# Patient Record
Sex: MALE | Race: WHITE | NOT HISPANIC OR LATINO | Employment: FULL TIME | URBAN - NONMETROPOLITAN AREA
[De-identification: names, ages, dates, MRNs, and addresses within clinical notes are randomized per-mention and may not be internally consistent; named-entity substitution may affect disease eponyms.]

---

## 2017-02-28 ENCOUNTER — APPOINTMENT (OUTPATIENT)
Dept: GENERAL RADIOLOGY | Facility: HOSPITAL | Age: 42
End: 2017-02-28

## 2017-02-28 ENCOUNTER — HOSPITAL ENCOUNTER (EMERGENCY)
Facility: HOSPITAL | Age: 42
Discharge: HOME | End: 2017-02-28
Attending: EMERGENCY MEDICINE | Admitting: EMERGENCY MEDICINE

## 2017-02-28 VITALS
BODY MASS INDEX: 29.66 KG/M2 | RESPIRATION RATE: 20 BRPM | HEART RATE: 65 BPM | SYSTOLIC BLOOD PRESSURE: 128 MMHG | HEIGHT: 67 IN | DIASTOLIC BLOOD PRESSURE: 83 MMHG | TEMPERATURE: 98.2 F | WEIGHT: 189 LBS | OXYGEN SATURATION: 98 %

## 2017-02-28 DIAGNOSIS — R07.9 CHEST PAIN, UNSPECIFIED TYPE: Primary | ICD-10-CM

## 2017-02-28 LAB
ALBUMIN SERPL-MCNC: 4.4 G/DL (ref 3.5–5)
ALBUMIN/GLOB SERPL: 1.4 G/DL (ref 1.1–2.5)
ALP SERPL-CCNC: 58 U/L (ref 24–120)
ALT SERPL W P-5'-P-CCNC: 33 U/L (ref 0–54)
AMYLASE SERPL-CCNC: 49 U/L (ref 30–110)
ANION GAP SERPL CALCULATED.3IONS-SCNC: 11 MMOL/L (ref 4–13)
AST SERPL-CCNC: 38 U/L (ref 7–45)
BASOPHILS # BLD AUTO: 0.03 10*3/MM3 (ref 0–0.2)
BASOPHILS NFR BLD AUTO: 0.3 % (ref 0–2)
BILIRUB SERPL-MCNC: 0.9 MG/DL (ref 0.1–1)
BUN BLD-MCNC: 12 MG/DL (ref 5–21)
BUN/CREAT SERPL: 13.5 (ref 7–25)
CALCIUM SPEC-SCNC: 9.6 MG/DL (ref 8.4–10.4)
CHLORIDE SERPL-SCNC: 103 MMOL/L (ref 98–110)
CO2 SERPL-SCNC: 25 MMOL/L (ref 24–31)
CREAT BLD-MCNC: 0.89 MG/DL (ref 0.5–1.4)
D DIMER PPP FEU-MCNC: <0.22 MG/L (FEU) (ref 0–0.5)
DEPRECATED RDW RBC AUTO: 40.1 FL (ref 40–54)
EOSINOPHIL # BLD AUTO: 0.07 10*3/MM3 (ref 0–0.7)
EOSINOPHIL NFR BLD AUTO: 0.7 % (ref 0–4)
ERYTHROCYTE [DISTWIDTH] IN BLOOD BY AUTOMATED COUNT: 12.5 % (ref 12–15)
GFR SERPL CREATININE-BSD FRML MDRD: 94 ML/MIN/1.73
GLOBULIN UR ELPH-MCNC: 3.1 GM/DL
GLUCOSE BLD-MCNC: 97 MG/DL (ref 70–100)
HCT VFR BLD AUTO: 39.8 % (ref 40–52)
HGB BLD-MCNC: 14.5 G/DL (ref 14–18)
IMM GRANULOCYTES # BLD: 0.02 10*3/MM3 (ref 0–0.03)
IMM GRANULOCYTES NFR BLD: 0.2 % (ref 0–5)
LIPASE SERPL-CCNC: 32 U/L (ref 23–203)
LYMPHOCYTES # BLD AUTO: 4.02 10*3/MM3 (ref 0.72–4.86)
LYMPHOCYTES NFR BLD AUTO: 38.8 % (ref 15–45)
MCH RBC QN AUTO: 31.9 PG (ref 28–32)
MCHC RBC AUTO-ENTMCNC: 36.4 G/DL (ref 33–36)
MCV RBC AUTO: 87.7 FL (ref 82–95)
MONOCYTES # BLD AUTO: 0.98 10*3/MM3 (ref 0.19–1.3)
MONOCYTES NFR BLD AUTO: 9.5 % (ref 4–12)
NEUTROPHILS # BLD AUTO: 5.23 10*3/MM3 (ref 1.87–8.4)
NEUTROPHILS NFR BLD AUTO: 50.5 % (ref 39–78)
NRBC BLD MANUAL-RTO: 0 /100 WBC (ref 0–0)
NT-PROBNP SERPL-MCNC: 27.9 PG/ML (ref 0–450)
PLATELET # BLD AUTO: 217 10*3/MM3 (ref 130–400)
PMV BLD AUTO: 10.5 FL (ref 6–12)
POTASSIUM BLD-SCNC: 3.9 MMOL/L (ref 3.5–5.3)
PROT SERPL-MCNC: 7.5 G/DL (ref 6.3–8.7)
RBC # BLD AUTO: 4.54 10*6/MM3 (ref 4.8–5.9)
SODIUM BLD-SCNC: 139 MMOL/L (ref 135–145)
TROPONIN I SERPL-MCNC: 0 NG/ML (ref 0–0.07)
WBC NRBC COR # BLD: 10.35 10*3/MM3 (ref 4.8–10.8)

## 2017-02-28 PROCEDURE — 99283 EMERGENCY DEPT VISIT LOW MDM: CPT

## 2017-02-28 PROCEDURE — 82150 ASSAY OF AMYLASE: CPT | Performed by: EMERGENCY MEDICINE

## 2017-02-28 PROCEDURE — 71010 HC CHEST PA OR AP: CPT

## 2017-02-28 PROCEDURE — 85379 FIBRIN DEGRADATION QUANT: CPT | Performed by: EMERGENCY MEDICINE

## 2017-02-28 PROCEDURE — 85025 COMPLETE CBC W/AUTO DIFF WBC: CPT | Performed by: EMERGENCY MEDICINE

## 2017-02-28 PROCEDURE — 93010 ELECTROCARDIOGRAM REPORT: CPT | Performed by: INTERNAL MEDICINE

## 2017-02-28 PROCEDURE — 83880 ASSAY OF NATRIURETIC PEPTIDE: CPT | Performed by: EMERGENCY MEDICINE

## 2017-02-28 PROCEDURE — 83690 ASSAY OF LIPASE: CPT | Performed by: EMERGENCY MEDICINE

## 2017-02-28 PROCEDURE — 84484 ASSAY OF TROPONIN QUANT: CPT

## 2017-02-28 PROCEDURE — 80053 COMPREHEN METABOLIC PANEL: CPT | Performed by: EMERGENCY MEDICINE

## 2017-02-28 PROCEDURE — 93005 ELECTROCARDIOGRAM TRACING: CPT | Performed by: EMERGENCY MEDICINE

## 2017-02-28 RX ORDER — ASPIRIN 81 MG/1
81 TABLET, CHEWABLE ORAL DAILY
COMMUNITY

## 2017-02-28 RX ORDER — LANSOPRAZOLE 30 MG/1
30 CAPSULE, DELAYED RELEASE ORAL DAILY
COMMUNITY

## 2017-02-28 RX ORDER — ALPRAZOLAM 0.5 MG/1
0.5 TABLET ORAL 2 TIMES DAILY PRN
COMMUNITY

## 2017-02-28 RX ORDER — SODIUM CHLORIDE 0.9 % (FLUSH) 0.9 %
10 SYRINGE (ML) INJECTION AS NEEDED
Status: DISCONTINUED | OUTPATIENT
Start: 2017-02-28 | End: 2017-03-01 | Stop reason: HOSPADM

## 2017-03-01 ENCOUNTER — TRANSCRIBE ORDERS (OUTPATIENT)
Dept: ADMINISTRATIVE | Facility: HOSPITAL | Age: 42
End: 2017-03-01

## 2017-03-01 DIAGNOSIS — R07.9 CHEST PAIN, UNSPECIFIED TYPE: Primary | ICD-10-CM

## 2017-03-01 LAB
HOLD SPECIMEN: NORMAL
HOLD SPECIMEN: NORMAL
WHOLE BLOOD HOLD SPECIMEN: NORMAL
WHOLE BLOOD HOLD SPECIMEN: NORMAL

## 2017-03-01 NOTE — ED PROVIDER NOTES
Subjective   HPI Comments: The patient presents to the emergency complaining of chest pain has been in his chest since yesterday.  It is been off and on but there most of the time.  He does admit that he is fully aware he has a lot of anxiety and that.  Part of the problem.  However he does have a strong family history of heart disease and slightly warm to get checked out.  He says he had an episode of atrial fibrillation here and had a heart rate in 180s and 190s and had no chest pain with that so Dr. Arevalo who told him he probably did not have any blockages in his heart but he still wanted to be checked to be certain.    Patient is a 42 y.o. male presenting with chest pain.   History provided by:  Patient   used: No    Chest Pain   Pain location:  L chest  Pain radiates to:  Does not radiate  Pain severity:  Mild  Onset quality:  Gradual  Duration:  1 day  Timing:  Intermittent  Progression:  Unchanged  Chronicity:  New  Context: not breathing, not drug use, not eating, not intercourse, not lifting, not movement, not raising an arm, not at rest, not stress and not trauma    Relieved by:  Nothing  Worsened by:  Nothing  Ineffective treatments:  None tried  Associated symptoms: no abdominal pain, no AICD problem, no altered mental status, no diaphoresis, no fever, no heartburn, no numbness, no palpitations and no shortness of breath    Risk factors: male sex    Risk factors: no aortic disease and no birth control        Review of Systems   Constitutional: Negative.  Negative for diaphoresis and fever.   HENT: Negative.    Respiratory: Negative.  Negative for shortness of breath.    Cardiovascular: Positive for chest pain. Negative for palpitations.   Gastrointestinal: Negative.  Negative for abdominal pain and heartburn.   Genitourinary: Negative.    Musculoskeletal: Negative.    Skin: Negative.    Neurological: Negative.  Negative for numbness.   Hematological: Negative.     Psychiatric/Behavioral: Negative.    All other systems reviewed and are negative.      Past Medical History   Diagnosis Date   • Anxiety      NOS   • Atrial fibrillation by electrocardiogram    • Depression      NOS   • Hypertension, essential, benign    • Palpitations        No Known Allergies    History reviewed. No pertinent past surgical history.    Family History   Problem Relation Age of Onset   • Heart attack Father      11   • Heart disease Father      7 stents, carbon fiber stent, willow of life   • Heart disease Sister       3 Stents   • Heart attack Sister    • Heart attack Maternal Grandfather    • Heart disease Sister      Living irregular heartbeat       Social History     Social History   • Marital status:      Spouse name: N/A   • Number of children: N/A   • Years of education: N/A     Social History Main Topics   • Smoking status: Former Smoker   • Smokeless tobacco: Current User      Comment:  Quit smoking 10 mos ago, quit chewing tobacco 2 weeks ago, wearing nicotine patch   • Alcohol use Yes      Comment: Occasional    • Drug use: None   • Sexual activity: Not Asked     Other Topics Concern   • None     Social History Narrative       Prior to Admission medications    Medication Sig Start Date End Date Taking? Authorizing Provider   ALPRAZolam (XANAX) 0.5 MG tablet Take 0.5 mg by mouth 2 (Two) Times a Day As Needed for anxiety.   Yes Historical Provider, MD   aspirin 81 MG chewable tablet Chew 81 mg Daily.   Yes Historical Provider, MD   lansoprazole (PREVACID) 30 MG capsule Take 30 mg by mouth Daily.   Yes Historical Provider, MD   metoprolol tartrate (LOPRESSOR) 25 MG tablet Take 25 mg by mouth 2 (Two) Times a Day.   Yes Historical Provider, MD   sertraline (ZOLOFT) 50 MG tablet Take 50 mg by mouth Daily.   Yes Historical Provider, MD       Medications - No data to display    Vitals:    02/28/17 2234   BP: 128/83   Pulse: 65   Resp: 20   Temp: 98.2 °F (36.8 °C)   SpO2: 98%          Objective   Physical Exam   Constitutional: He is oriented to person, place, and time. He appears well-developed and well-nourished.   HENT:   Head: Normocephalic and atraumatic.   Neck: Normal range of motion. Neck supple.   Cardiovascular: Normal rate and regular rhythm.    Pulmonary/Chest: Effort normal and breath sounds normal.   Abdominal: Soft. Bowel sounds are normal.   Musculoskeletal: Normal range of motion.   Neurological: He is alert and oriented to person, place, and time.   Skin: Skin is warm and dry.   Psychiatric: He has a normal mood and affect. His behavior is normal.   Nursing note and vitals reviewed.      Procedures         Lab Results (last 24 hours)     Procedure Component Value Units Date/Time    CBC & Differential [[id number]] Collected:  02/28/17 2103    Specimen:  Blood Updated:  02/28/17 2121    Narrative:       The following orders were created for panel order CBC & Differential.  Procedure                               Abnormality         Status                     ---------                               -----------         ------                     CBC Auto Differential[[id number]]         Abnormal            Final result                 Please view results for these tests on the individual orders.    Comprehensive Metabolic Panel [[id number]] Collected:  02/28/17 2103    Specimen:  Blood Updated:  02/28/17 2146     Glucose 97 mg/dL      BUN 12 mg/dL      Creatinine 0.89 mg/dL      Sodium 139 mmol/L      Potassium 3.9 mmol/L      Chloride 103 mmol/L      CO2 25.0 mmol/L      Calcium 9.6 mg/dL      Total Protein 7.5 g/dL      Albumin 4.40 g/dL      ALT (SGPT) 33 U/L      AST (SGOT) 38 U/L      Alkaline Phosphatase 58 U/L      Total Bilirubin 0.9 mg/dL      eGFR Non African Amer 94 mL/min/1.73      Globulin 3.1 gm/dL      A/G Ratio 1.4 g/dL      BUN/Creatinine Ratio 13.5      Anion Gap 11.0 mmol/L     Amylase [[id number]]  (Normal) Collected:  02/28/17 2103    Specimen:  Blood Updated:   02/28/17 2146     Amylase 49 U/L     Lipase [[id number]]  (Normal) Collected:  02/28/17 2103    Specimen:  Blood Updated:  02/28/17 2146     Lipase 32 U/L     D-dimer, Quantitative [[id number]]  (Normal) Collected:  02/28/17 2103    Specimen:  Blood Updated:  02/28/17 2128     D-Dimer, Quantitative <0.22 mg/L (FEU)     Narrative:       Reference Range is 0-0.50 mg/L FEU. However, results <0.50 mg/L FEU tends to rule out DVT or PE. Results >0.50 mg/L FEU are not useful in predicting absence or presence of DVT or PE.    BNP [[id number]]  (Normal) Collected:  02/28/17 2103    Specimen:  Blood Updated:  02/28/17 2155     proBNP 27.9 pg/mL     CBC Auto Differential [[id number]]  (Abnormal) Collected:  02/28/17 2103    Specimen:  Blood Updated:  02/28/17 2121     WBC 10.35 10*3/mm3      RBC 4.54 (L) 10*6/mm3      Hemoglobin 14.5 g/dL      Hematocrit 39.8 (L) %      MCV 87.7 fL      MCH 31.9 pg      MCHC 36.4 (H) g/dL      RDW 12.5 %      RDW-SD 40.1 fl      MPV 10.5 fL      Platelets 217 10*3/mm3      Neutrophil % 50.5 %      Lymphocyte % 38.8 %      Monocyte % 9.5 %      Eosinophil % 0.7 %      Basophil % 0.3 %      Immature Grans % 0.2 %      Neutrophils, Absolute 5.23 10*3/mm3      Lymphocytes, Absolute 4.02 10*3/mm3      Monocytes, Absolute 0.98 10*3/mm3      Eosinophils, Absolute 0.07 10*3/mm3      Basophils, Absolute 0.03 10*3/mm3      Immature Grans, Absolute 0.02 10*3/mm3      nRBC 0.0 /100 WBC     POC Troponin, Rapid [[id number]]  (Normal) Collected:  02/28/17 2113    Specimen:  Blood Updated:  02/28/17 2125     Troponin I 0.00 ng/mL       Serial Number: [id number]    : [id number]             XR Chest 1 View   Final Result   Impression: No evidence of acute cardiopulmonary disease.   This report was finalized on 02/28/2017 21:25 by Dr. Tayo Frederick MD.          ED Course  ED Course   Comment By Time   I told the patient that his testing here was all fine with no signs of MI.  I did tell him and if he wanted to be  certain we would need to get a stress test which would mean waiting for second set of enzymes and getting a stress test the morning.  However he does not want to do that.  He feels fine with the testing we have done so far and wants to get this testing done as an outpatient and I think that is reasonable.  We will set him up for an outpatient stress test and he can keep his appointment with Dr. Arevalo later this month. Gray Dewitt Jr., MD 03/01 0234     HEART Score  History: Slightly suspicious (+0)  ECG: Normal (+0)  Age: Less than 45 (+0)  Risk Factors: 1 - 2 risk factors (+1)  Troponin: Normal limit or lower (+0)  Total: 1        MDM  Number of Diagnoses or Management Options  Chest pain, unspecified type: new and requires workup     Amount and/or Complexity of Data Reviewed  Clinical lab tests: ordered and reviewed  Tests in the radiology section of CPT®: ordered and reviewed  Tests in the medicine section of CPT®: ordered and reviewed    Risk of Complications, Morbidity, and/or Mortality  Presenting problems: moderate  Diagnostic procedures: moderate  Management options: moderate    Patient Progress  Patient progress: stable      Final diagnoses:   Chest pain, unspecified type        Gray Dewitt Jr., MD  03/01/17 0230       Gray Dewitt Jr., MD  03/01/17 0234

## 2017-10-09 PROBLEM — I48.91 ATRIAL FIBRILLATION BY ELECTROCARDIOGRAM (HCC): Status: ACTIVE | Noted: 2017-10-09

## 2017-10-09 PROBLEM — R00.2 PALPITATIONS: Status: ACTIVE | Noted: 2017-10-09

## 2017-10-09 PROBLEM — F41.9 ANXIETY: Status: ACTIVE | Noted: 2017-10-09

## 2017-10-09 PROBLEM — F32.A DEPRESSION: Status: ACTIVE | Noted: 2017-10-09

## 2017-10-09 PROBLEM — I10 HYPERTENSION, ESSENTIAL, BENIGN: Status: ACTIVE | Noted: 2017-10-09

## 2017-10-11 ENCOUNTER — OFFICE VISIT (OUTPATIENT)
Dept: CARDIOLOGY | Facility: CLINIC | Age: 42
End: 2017-10-11

## 2017-10-11 VITALS
HEIGHT: 67 IN | SYSTOLIC BLOOD PRESSURE: 120 MMHG | DIASTOLIC BLOOD PRESSURE: 64 MMHG | BODY MASS INDEX: 30.29 KG/M2 | WEIGHT: 193 LBS | HEART RATE: 72 BPM

## 2017-10-11 DIAGNOSIS — I48.0 PAROXYSMAL ATRIAL FIBRILLATION (HCC): Primary | ICD-10-CM

## 2017-10-11 DIAGNOSIS — I10 HYPERTENSION, ESSENTIAL, BENIGN: ICD-10-CM

## 2017-10-11 PROCEDURE — 93000 ELECTROCARDIOGRAM COMPLETE: CPT | Performed by: INTERNAL MEDICINE

## 2017-10-11 PROCEDURE — 99213 OFFICE O/P EST LOW 20 MIN: CPT | Performed by: INTERNAL MEDICINE

## 2017-10-11 RX ORDER — ESCITALOPRAM OXALATE 10 MG/1
10 TABLET ORAL DAILY
COMMUNITY

## 2017-10-11 RX ORDER — PROPAFENONE HYDROCHLORIDE 150 MG/1
150 TABLET, COATED ORAL AS NEEDED
COMMUNITY

## 2017-10-11 NOTE — PROGRESS NOTES
Subjective    Yovanny Orlando is a 42 y.o. male. Fu of rhythm and bp    History of Present Illness     PAR AFIB:  Had an ER visit 5/16 with spont conversion to NSR. Carries Rythmol for prn use but has not had to use it since. ECHO and HOLTER were ok and EKG today is normal. Has some transient palpitations 1 mo ago that broke with valsalva maneuver.  Has a less stressful job now. Is sensitive to caffeine and stress and avoids these as possible.    HTN:  Has good control at clinic checks and is compliant with meds        The following portions of the patient's history were reviewed and updated as appropriate: allergies, current medications, past family history, past medical history, past social history, past surgical history and problem list.    Patient Active Problem List   Diagnosis   • Paroxysmal atrial fibrillation   • Hypertension, essential, benign   • Palpitations   • Anxiety   • Depression       No Known Allergies    Family History   Problem Relation Age of Onset   • Heart attack Father      11   • Heart disease Father      7 stents, carbon fiber stent, willow of life   • Heart disease Sister       3 Stents   • Heart attack Sister    • Heart attack Maternal Grandfather    • Heart disease Sister      Living irregular heartbeat   • No Known Problems Mother        Social History     Social History   • Marital status:      Spouse name: N/A   • Number of children: N/A   • Years of education: N/A     Occupational History   • Not on file.     Social History Main Topics   • Smoking status: Former Smoker   • Smokeless tobacco: Current User      Comment:  Quit smoking 10 mos ago, quit chewing tobacco 2 weeks ago, wearing nicotine patch   • Alcohol use Yes      Comment: Occasional    • Drug use: No   • Sexual activity: Not on file     Other Topics Concern   • Not on file     Social History Narrative         Current Outpatient Prescriptions:   •  ALPRAZolam (XANAX) 0.5 MG tablet, Take 0.5 mg by mouth 2 (Two)  "Times a Day As Needed for anxiety., Disp: , Rfl:   •  aspirin 81 MG chewable tablet, Chew 81 mg Daily., Disp: , Rfl:   •  escitalopram (LEXAPRO) 10 MG tablet, Take 10 mg by mouth Daily., Disp: , Rfl:   •  lansoprazole (PREVACID) 30 MG capsule, Take 30 mg by mouth Daily., Disp: , Rfl:   •  metoprolol tartrate (LOPRESSOR) 25 MG tablet, Take 25 mg by mouth Daily., Disp: , Rfl:   •  propafenone (RYTHMOL) 150 MG tablet, Take 150 mg by mouth As Needed., Disp: , Rfl:     Past Surgical History:   Procedure Laterality Date   • SMALL INTESTINE SURGERY         Review of Systems   Respiratory: Negative for apnea, chest tightness and shortness of breath.    Cardiovascular: Negative for chest pain, palpitations and leg swelling.   Gastrointestinal: Negative for abdominal pain.   Genitourinary: Negative for dysuria.   Musculoskeletal: Negative for myalgias.   Neurological: Negative for weakness and light-headedness.   Psychiatric/Behavioral: Negative for sleep disturbance.        Snores       /64 (BP Location: Left arm, Patient Position: Sitting)  Pulse 72  Ht 67\" (170.2 cm)  Wt 193 lb (87.5 kg)  BMI 30.23 kg/m2  Procedures    Objective   Physical Exam   Constitutional: He is oriented to person, place, and time. He appears well-developed and well-nourished. No distress.   HENT:   Head: Normocephalic.   Eyes: Conjunctivae are normal. Pupils are equal, round, and reactive to light.   Neck: No thyromegaly present.   Cardiovascular: Normal rate, regular rhythm and intact distal pulses.  Exam reveals no gallop and no friction rub.    No murmur heard.  Pulmonary/Chest: Effort normal and breath sounds normal. No respiratory distress. He has no wheezes. He has no rales.   Abdominal: Soft. Bowel sounds are normal. He exhibits no distension. There is no tenderness.   Musculoskeletal: He exhibits no edema or tenderness.   Neurological: He is alert and oriented to person, place, and time.   Skin: Skin is warm and dry.   Psychiatric: " He has a normal mood and affect.       Assessment/Plan   Yovanny was seen today for atrial fibrillation, hypertension and chest pain.    Diagnoses and all orders for this visit:    Paroxysmal atrial fibrillation  Comments:  rare now.  Orders:  -     ECG 12 Lead    Hypertension, essential, benign  Comments:  controlled                 Return if symptoms worsen or fail to improve.  Orders Placed This Encounter   Procedures   • ECG 12 Lead     Order Specific Question:   Reason for Exam:     Answer:   Afib

## 2019-01-09 ENCOUNTER — TELEPHONE (OUTPATIENT)
Dept: CARDIOLOGY | Facility: CLINIC | Age: 44
End: 2019-01-09

## 2019-01-09 NOTE — TELEPHONE ENCOUNTER
Crissy from Dr. Zaldivar's office in [place], [place] has called in needing a risk assessment for cervical fusion on 1/29 under general anesthesia. Patient did not have F/U on file, are you able to give risk assessment?       [phone] ext [phone] fax
